# Patient Record
Sex: MALE | Race: BLACK OR AFRICAN AMERICAN | NOT HISPANIC OR LATINO
[De-identification: names, ages, dates, MRNs, and addresses within clinical notes are randomized per-mention and may not be internally consistent; named-entity substitution may affect disease eponyms.]

---

## 2024-09-21 ENCOUNTER — RESULT CHARGE (OUTPATIENT)
Age: 64
End: 2024-09-21

## 2024-09-21 ENCOUNTER — APPOINTMENT (OUTPATIENT)
Dept: ORTHOPEDIC SURGERY | Facility: CLINIC | Age: 64
End: 2024-09-21

## 2024-09-21 DIAGNOSIS — Z78.9 OTHER SPECIFIED HEALTH STATUS: ICD-10-CM

## 2024-09-21 DIAGNOSIS — M17.11 UNILATERAL PRIMARY OSTEOARTHRITIS, RIGHT KNEE: ICD-10-CM

## 2024-09-21 PROBLEM — Z00.00 ENCOUNTER FOR PREVENTIVE HEALTH EXAMINATION: Status: ACTIVE | Noted: 2024-09-21

## 2024-09-21 PROCEDURE — 99203 OFFICE O/P NEW LOW 30 MIN: CPT | Mod: 25

## 2024-09-21 PROCEDURE — 73562 X-RAY EXAM OF KNEE 3: CPT | Mod: RT

## 2024-09-21 NOTE — HISTORY OF PRESENT ILLNESS
[de-identified] : Patient 64-year-old male presents with a 1 week history of right knee pain.  He says it was bothering him some before that but is gotten worse over the past week.  Denies any injury or fall.  Denies any prior treatment.  Denies any fevers or chills. Does state the pain radiates down sometimes

## 2024-09-21 NOTE — IMAGING
[Right] : right knee [de-identified] : He is alert and oriented vital signs are stable.  Examination right knee reveals a mild effusion.  He has no retropatellar tenderness.  He has a range of motion of 0 to 90 degrees.  Does have medial joint line tenderness no lateral joint tenderness no varus valgus instability and negative Lachman sign and cannot cooperate Annia's sign.  He had no calf tenderness.  He Is able to straight leg raise.  He had a normal neurologic exam. Normal vascular exam. Normal skin exam. No evidence for open wounds infection or compartment syndrome. [FreeTextEntry9] : X-rays right knee 3 views revealed no fracture or dislocations.  There was evidence for arthritic changes in the knee tricompartmental

## 2024-09-21 NOTE — ASSESSMENT
[FreeTextEntry1] : Right knee osteoarthritis  Plan anti-inflammatories with GI precautions, ice 20 minutes on 20 minutes off he could also try Tylenol.  I am giving him a prescription for physical therapy and I did recommend he follow-up with a knee specialist that participates as an insurance plan.  He may be a candidate for gel injections or cortisone injections at that time.  All questions were answered he is agreeable with the plan.  Patient was advised if they develop any severe pain, numbness or tingling or pain with range of motion to the fingers they must call or go to the emergency room immediately. All the signs and symptoms of compartment syndrome were explained.  The patient understands.  This medical record was created utilizing Dragon voice recognition software.  This software is not perfect and may occasionally create typographical errors which may be reflected in the above medical record.